# Patient Record
(demographics unavailable — no encounter records)

---

## 2024-11-25 NOTE — HISTORY OF PRESENT ILLNESS
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 MAC was administered by anesthesiologist.  Continuous pulse oximetry and blood pressure monitoring were used throughout the  procedure. Supplemental oxygen was used. Patient was placed in the left lateral  decubitus position. The endoscope was introduced through the mouth and advanced  under direct visualization until the second part of the duodenum was reached.  Patient tolerance to the procedure was good. The procedure was not difficult.  Blood loss was minimal.    Limitations/Complications:    There were no apparent limitations or complications    Findings:    Esophagus Mucosa Localized irregularity of the mucosa was noted in the Z-line  and gastroesophageal junction. These findings raise suspicion for Irvin's  intestinal metaplasia.Biopsies were performed.    Normal mucosa was noted in the middle third of the esophagus.Multiple cold  forceps biopsies were performed for histology.    Normal mucosa was noted in the upper third of the esophagus.Multiple cold  forceps biopsies were performed for histology.    Stomach Mucosa Normal mucosa was noted in the stomach. From the antrum and  body.Multiple cold forceps biopsies were performed for histology.    Duodenum Mucosa Otherwise normal mucosa was noted in the whole examined  duodenum. Random mucosal biopsies were obtained to evaluate for histologic  features of celiac disease.Multiple cold forceps biopsies were performed for  histology in the second part of the duodenum.    Protruding lesions A single sessile 5 mm polyp of benign appearance was found in  the first part of the duodenum.A piece-meal polypectomy was performed using a  cold forceps. The polyp was completely removed.    Impressions:    Irregularity in the Z-line and gastroesophageal junction. (Biopsy).    Normal mucosa in the middle third of the esophagus. (Biopsy).    Normal mucosa in the upper third of the esophagus. (Biopsy).    Normal mucosa in the stomach. (Biopsy).    Polyp (5 mm) in the first part of the duodenum. (Polypectomy).    Otherwise normal mucosa in the whole examined duodenum. (Biopsy).    Plan:    Await pathology results.    Continue current GI medications  FINAL DIAGNOSIS  A.  Duodenum, biopsy: Duodenal mucosa showing no significant histopathologic changes.  B.  Duodenal bulb, polyp, biopsy: Small intestinal mucosa showing nodular area of gastric-type mucosa, consistent with gastric heterotopia.  C.  Stomach, biopsy: Gastric mucosa with antral and oxyntic features showing mild patchy chronic inflammation. Oxyntic-type mucosa shows parietal cell changes suggestive of PPI effect. No Helicobacter-like organisms identified on Diff Quik stain.  D.  GE junction, biopsy: Gastric cardia and oxyntic-type mucosa showing chronic inflammation and focal intestinal metaplasia. Associated esophageal squamous mucosa showing hyperplastic changes and a rare intraepithelial eosinophil. No dysplasia is identified. See Note.  Note: The intestinal metaplasia may represent either Irvin's esophagus or intestinal metaplasia of the gastric cardia. Clinical and endoscopic correlation is recommended.  E.  Mid esophagus, biopsy: Esophageal squamous mucosa showing no significant histopathologic changes.  F.  Proximal esophagus, biopsy: Two fragments of esophageal squamous mucosa. One fragment shows hyperplastic changes and intraepithelial eosinophils, focally up to 10 per high power field. The other fragment shows no significant histopathologic changes. See Note.  Note: The differential diagnosis of esophagitis with eosinophils includes reflux esophagitis, eosinophilic esophagitis, pill-induced esophagitis, drug reaction and allergic and collagen vascular diseases. Clinical correlation is recommended. ICD CODES  K20.8, K29.70 Pre-Op Diagnosis: Eosinophilic esophagitis, Irvin's esophagus. Post-Op Diagnosis: Same, duodenal bulb polyp.  [FreeTextEntry1] : GI PROCEDURE NOTE Consent: Consent obtained after a full discussion of risks, benefits, and alternatives to the procedure; these were understood and agreed upon prior to initiation of the procedure.  All questions were answered. Procedure:  Colonoscopy Colonoscopy: After obtaining informed consent and explaining the risks / benefits and alternatives to the procedures to the patient ( including but not limited to bleeding / perforation / anesthesia complication/possibility of missed lesions) and after the patient expressed understanding and a desire to proceed, a COLONOSCOPY was performed.  Normal rectal/VIVIENNE  For the COLONOSCOPY, a  AL colonoscope was advanced from the rectum up to and including the cecum and terminal ileum. The ileocecal valve was identified as well as the appendiceal orifice. Second evaluation and retroflexion in the ascending colon was performed.  -normal appearing terminal ileum -normal appearing ileocecal valve in forward and retroflexed view, multiple biopsies obtained.  -6 mm ascending colon polyp, removed with cold biopsy forceps, retrieved and sent to pathology -4 mm rectal polyp, removed with cold biopsy forceps, retrieved and sent to pathology -diverticulosis  -internal hemorrhoids seen on retroflexion in the rectum -the patient tolerated the procedure without difficulty.  -the preparation for the colonoscopy was adequate. PRE-procedure Diagnosis:  screening, h/o colon polyps POST-procedure Diagnosis:  polyps, diverticulosis, hemorrhoids Anesthesia:  Deep Sedation Specimen removed:  see above Findings: see above Follow-Up Plan: follow up pathology results repeat colonoscopy in 2-3 years pending pathology results  FINAL DIAGNOSIS   A.  Ileocecal valve biopsy: -  Ileocolic mucosa with focal mild acute and chronic inflammation. -  Negative for granulomas.  B.  Ascending colon polyp: -  Adenomatous polyp.  C.  Rectal polyp: -  Hyperplastic polyp.

## 2024-11-25 NOTE — ASSESSMENT
[FreeTextEntry1] : Diarrhea - Now resolved. - Will assess labs today.   CRC screening, BE surveillance - F/u Dr. Beatty to schedule procedures.

## 2024-12-02 NOTE — HISTORY OF PRESENT ILLNESS
[FreeTextEntry8] : [place] in 2.5 [place] for 9 days.  skull itching worse at night. wheneer she's upset - itching/bburning sensation starts  sleep can fall asleep, wakes up and takes a while to sleep.  worsening anxietyu- doesn't wanna go to trip, going anywhere   18yrs ago - prozac for few months - helped.  lexapro - paranoia  zoloft - zonked.  flying. - xanax.

## 2024-12-02 NOTE — HEALTH RISK ASSESSMENT
[Yes] : Yes [2 - 3 times a week (3 pts)] : 2 - 3  times a week (3 points) [1 or 2 (0 pts)] : 1 or 2 (0 points) [No falls in past year] : Patient reported no falls in the past year [0] : 2) Feeling down, depressed, or hopeless: Not at all (0) [PHQ-2 Negative - No further assessment needed] : PHQ-2 Negative - No further assessment needed [Never] : Never [[id number]] : 0

## 2025-02-05 NOTE — ADDENDUM
[FreeTextEntry1] :  Patient presents for Pneumococcal vaccination (Prevnar 20) ordered by  Prior to administration, reviewed Pneumococcal VIS with patient who verbalized understanding. Patient denies allergy to any of pneumococcal components. Pt denies hx of allergic reaction to vaccinations.   Manufactuer:[employer] NDC:[id number] Lot:[id number] Expiration:05/01/2026    Patient tolerated Prevnar 20 vaccination well on RIGHT Deltoid.  No immediate adverse reaction noted.  Patient provided with VIS for home review as per protocol.   Isabella Ramsey RN

## 2025-02-05 NOTE — ASSESSMENT
[FreeTextEntry1] : 73 year old female presented for an annual physical exam.  routine blood work today, blood collected in the office. up to date with screening. EKG: NSR PCV 20 given today referred for mammogram/US, dexa will call back with results.

## 2025-02-05 NOTE — HEALTH RISK ASSESSMENT
[Excellent] : ~his/her~  mood as  excellent [2 - 3 times a week (3 pts)] : 2 - 3  times a week (3 points) [1 or 2 (0 pts)] : 1 or 2 (0 points) [No falls in past year] : Patient reported no falls in the past year [0] : 2) Feeling down, depressed, or hopeless: Not at all (0) [PHQ-2 Negative - No further assessment needed] : PHQ-2 Negative - No further assessment needed [With Significant Other] : lives with significant other [Retired] : retired [Graduate School] : graduate school [] :  [# Of Children ___] : has [unfilled] children [Never (0 pts)] : Never (0 points) [No] : In the past 12 months have you used drugs other than those required for medical reasons? No [Never] : Never [Patient reported mammogram was normal] : Patient reported mammogram was normal [Patient reported PAP Smear was normal] : Patient reported PAP Smear was normal [Patient reported bone density results were normal] : Patient reported bone density results were normal [Patient reported colonoscopy was normal] : Patient reported colonoscopy was normal [HIV test declined] : HIV test declined [Hepatitis C test declined] : Hepatitis C test declined [None] : None [Fully functional (bathing, dressing, toileting, transferring, walking, feeding)] : Fully functional (bathing, dressing, toileting, transferring, walking, feeding) [Fully functional (using the telephone, shopping, preparing meals, housekeeping, doing laundry, using] : Fully functional and needs no help or supervision to perform IADLs (using the telephone, shopping, preparing meals, housekeeping, doing laundry, using transportation, managing medications and managing finances) [Reports normal functional visual acuity (ie: able to read med bottle)] : Reports normal functional visual acuity [Yes] : Yes [Audit-CScore] : 3 [de-identified] : walk [de-identified] : pretty good  [[id number]] : 0 [Reports changes in hearing] : Reports no changes in hearing [Reports changes in vision] : Reports no changes in vision [Reports changes in dental health] : Reports no changes in dental health [MammogramDate] : 10/22 [BoneDensityDate] : 10/22 [PapSmearDate] : 01/12 [ColonoscopyDate] : 05/22 [ColonoscopyComments] : f/u in 3yrs

## 2025-02-05 NOTE — HISTORY OF PRESENT ILLNESS
[FreeTextEntry1] : Annual  [de-identified] : 73-year-old female with a past medical history of mild intermittent asthma, osteoporosis of the lumbar spine, esophageal dysphagia, anxiety presenting for an annual physical exam.  Patient was prescribed fluoxetine in December which she reports taking for 3 weeks and then discontinuing because of side effects of memory loss.  Since she discontinued the medication the side effects resolved.  She uses Xanax as needed for flying or for driving the highway.  She is otherwise well and has no other concerns.